# Patient Record
Sex: FEMALE | Race: OTHER | HISPANIC OR LATINO | Employment: FULL TIME | URBAN - METROPOLITAN AREA
[De-identification: names, ages, dates, MRNs, and addresses within clinical notes are randomized per-mention and may not be internally consistent; named-entity substitution may affect disease eponyms.]

---

## 2020-03-14 ENCOUNTER — APPOINTMENT (OUTPATIENT)
Dept: URGENT CARE | Age: 55
End: 2020-03-14
Payer: OTHER MISCELLANEOUS

## 2020-03-14 ENCOUNTER — HOSPITAL ENCOUNTER (EMERGENCY)
Facility: HOSPITAL | Age: 55
Discharge: HOME | End: 2020-03-14
Attending: EMERGENCY MEDICINE | Admitting: EMERGENCY MEDICINE
Payer: OTHER MISCELLANEOUS

## 2020-03-14 VITALS
TEMPERATURE: 98.1 F | SYSTOLIC BLOOD PRESSURE: 129 MMHG | WEIGHT: 139.77 LBS | RESPIRATION RATE: 16 BRPM | OXYGEN SATURATION: 99 % | DIASTOLIC BLOOD PRESSURE: 71 MMHG | HEART RATE: 66 BPM
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
## 2020-03-14 DIAGNOSIS — M54.2 NECK PAIN: ICD-10-CM

## 2020-03-14 DIAGNOSIS — R51.9 HEADACHE: Primary | ICD-10-CM

## 2020-03-14 DIAGNOSIS — S09.90XA: ICD-10-CM

## 2020-03-14 DIAGNOSIS — T14.8XXA CONTUSION: ICD-10-CM

## 2020-03-14 PROCEDURE — G0382 LEV 3 HOSP TYPE B ED VISIT: HCPCS | Performed by: PHYSICIAN ASSISTANT

## 2020-03-14 PROCEDURE — 99284 EMERGENCY DEPT VISIT MOD MDM: CPT | Performed by: PHYSICIAN ASSISTANT

## 2020-03-14 PROCEDURE — 99283 EMERGENCY DEPT VISIT LOW MDM: CPT

## 2020-03-14 PROCEDURE — 99283 EMERGENCY DEPT VISIT LOW MDM: CPT | Performed by: PHYSICIAN ASSISTANT

## 2020-03-14 RX ORDER — IBUPROFEN 400 MG/1
800 TABLET ORAL ONCE
Status: COMPLETED | OUTPATIENT
Start: 2020-03-14 | End: 2020-03-14

## 2020-03-14 RX ORDER — IBUPROFEN 400 MG/1
400 TABLET ORAL EVERY 6 HOURS PRN
Qty: 30 TABLET | Refills: 0 | Status: SHIPPED | OUTPATIENT
Start: 2020-03-14 | End: 2020-03-14 | Stop reason: SDUPTHER

## 2020-03-14 RX ORDER — ACETAMINOPHEN 500 MG
500 TABLET ORAL EVERY 6 HOURS PRN
Qty: 30 TABLET | Refills: 0 | Status: SHIPPED | OUTPATIENT
Start: 2020-03-14 | End: 2020-03-14 | Stop reason: SDUPTHER

## 2020-03-14 RX ORDER — ACETAMINOPHEN 325 MG/1
975 TABLET ORAL ONCE
Status: COMPLETED | OUTPATIENT
Start: 2020-03-14 | End: 2020-03-14

## 2020-03-14 RX ORDER — IBUPROFEN 400 MG/1
400 TABLET ORAL EVERY 6 HOURS PRN
Qty: 30 TABLET | Refills: 0 | Status: SHIPPED | OUTPATIENT
Start: 2020-03-14 | End: 2022-04-04

## 2020-03-14 RX ORDER — ACETAMINOPHEN 500 MG
500 TABLET ORAL EVERY 6 HOURS PRN
Qty: 30 TABLET | Refills: 0 | Status: SHIPPED | OUTPATIENT
Start: 2020-03-14 | End: 2022-04-04

## 2020-03-14 RX ADMIN — IBUPROFEN 800 MG: 400 TABLET ORAL at 12:41

## 2020-03-14 RX ADMIN — ACETAMINOPHEN 975 MG: 325 TABLET ORAL at 12:42

## 2020-03-14 NOTE — ED PROVIDER NOTES
History  Chief Complaint   Patient presents with    Headache     pt c/o headache; pt states this morning a 2"x4" that was leaning against the wall fell forward and struck the pt on her right side of her head; pt denies any LOC or any thinners  pt denies cp/sob, pt denies n/v/d  At work this day and a 'stick' struck pt on head R side 24 leaning against the wall  720 am        History provided by:  Patient   used: [id number]  Injury   Location:  Right lateral head  Quality:  Sore  Severity:  Moderate  Onset quality:  Sudden  Timing:  Constant  Progression:  Unchanged  Chronicity:  New  Context:  Work related injury  Worsened by:  Nothing tried  Ineffective treatments:  Nothing tried  Associated symptoms: headaches    Associated symptoms: no abdominal pain, no chest pain and no fever    Associated symptoms comment:  Right lateral neck pain      None       History reviewed  No pertinent past medical history  Past Surgical History:   Procedure Laterality Date    HYSTERECTOMY         History reviewed  No pertinent family history  I have reviewed and agree with the history as documented  E-Cigarette/Vaping    E-Cigarette Use Never User      E-Cigarette/Vaping Substances     Social History     Tobacco Use    Smoking status: Never Smoker    Smokeless tobacco: Never Used   Substance Use Topics    Alcohol use: Yes     Frequency: Monthly or less     Drinks per session: 1 or 2     Comment: socially    Drug use: Never       Review of Systems   Constitutional: Negative for chills and fever  Eyes: Negative for pain  Respiratory: Negative for chest tightness  Cardiovascular: Negative for chest pain  Gastrointestinal: Negative for abdominal pain  Endocrine: Negative for polyphagia  Musculoskeletal: Positive for neck pain  Right lateral neck pain  Neurological: Positive for headaches  Negative for dizziness and weakness     All other systems reviewed and are negative  Physical Exam  Physical Exam   Constitutional: She is oriented to person, place, and time  She appears well-developed and well-nourished  HENT:   Head: Normocephalic  Eyes: Conjunctivae are normal    Neck: Normal range of motion  Neck supple  Cardiovascular: Normal rate  Pulmonary/Chest: Effort normal    Abdominal: Soft  Musculoskeletal: Normal range of motion  Neurological: She is alert and oriented to person, place, and time  She displays normal reflexes  No cranial nerve deficit or sensory deficit  She exhibits normal muscle tone  Coordination normal    Skin: Skin is warm  Capillary refill takes less than 2 seconds  Psychiatric: She has a normal mood and affect  Nursing note and vitals reviewed            Vital Signs  ED Triage Vitals [03/14/20 1200]   Temperature Pulse Respirations Blood Pressure SpO2   98 1 °F (36 7 °C) 65 16 133/63 100 %      Temp Source Heart Rate Source Patient Position - Orthostatic VS BP Location FiO2 (%)   Temporal Monitor Sitting Right arm --      Pain Score       Worst Possible Pain           Vitals:    03/14/20 1200 03/14/20 1346   BP: 133/63 129/71   Pulse: 65 66   Patient Position - Orthostatic VS: Sitting Lying         Visual Acuity  Visual Acuity      Most Recent Value   L Pupil Size (mm)  3   R Pupil Size (mm)  3          ED Medications  Medications   ibuprofen (MOTRIN) tablet 800 mg (800 mg Oral Given 3/14/20 1241)   acetaminophen (TYLENOL) tablet 975 mg (975 mg Oral Given 3/14/20 1242)       Diagnostic Studies  Results Reviewed     None                 No orders to display              Procedures  Procedures         ED Course                                 MDM  Number of Diagnoses or Management Options  Acute head injury:   Contusion:   Headache:   Neck pain:   Work related injury:   Diagnosis management comments: 58-year-old primary Danish-speaking female presents emergency department for work related injury this morning approximately 7: 20 a m  Gwen Garvin Patient states that she was in her work environment and a 2x4 please see imaging had fallen from the wall striking her in the right lateral head  Patient denies falling to the ground or loss of consciousness  Patient does admit to right lateral head pain as well as right neck pain  Patient denies weakness of the hands arms legs or feet  Patient denies any specific radiculopathy or changes of her overall standard condition  Did not feel that CT and or neck x-ray would aid in diagnosis or management this day  Encourage close follow-up with occupational medicine  Educated patient take medicines as prescribed  Educated patient on persistent worsening signs symptoms and to either follow up with primary care occupational medicine and return emergency department  Patient is understanding and agreement  The language line was utilized during the history physical and educational phases  Disposition  Final diagnoses:   Headache   Acute head injury   Work related injury   Neck pain   Contusion     Time reflects when diagnosis was documented in both MDM as applicable and the Disposition within this note     Time User Action Codes Description Comment    3/14/2020 12:55 PM Millie Cristino Add [R51] Headache     3/14/2020 12:55 PM Millie Cristino Add [H61 38AK] Acute head injury     3/14/2020 12:55 PM Millie Cristino Add [Y99 0] Work related injury     3/14/2020 12:56 PM Millie Cristino Add [M54 2] Neck pain     3/14/2020 12:56 PM Ernesto La Add Grayson Pérez  [address] Contusion       ED Disposition     ED Disposition Condition Date/Time Comment    Discharge Stable Sat Mar 14, 2020 12:55 PM Thomas Sorenson discharge to home/self care              Follow-up Information     Follow up With Specialties Details Why [address] Occupational Medicine Call today  Rubén Palumbo [address]  [phone]            Discharge Medication List as of 3/14/2020  1:00 PM      START taking these medications Details   acetaminophen (TYLENOL) 500 mg tablet Take 1 tablet (500 mg total) by mouth every 6 (six) hours as needed for mild pain or fever, Starting Sat 3/14/2020, Print      ibuprofen (MOTRIN) 400 mg tablet Take 1 tablet (400 mg total) by mouth every 6 (six) hours as needed for mild pain or fever, Starting Sat 3/14/2020, Print           No discharge procedures on file      PDMP Review     None          ED Provider  Electronically Signed by           Cira Lynne PA-C  03/14/20 4474

## 2020-03-16 ENCOUNTER — APPOINTMENT (OUTPATIENT)
Dept: URGENT CARE | Age: 55
End: 2020-03-16
Payer: OTHER MISCELLANEOUS

## 2020-03-16 PROCEDURE — 99214 OFFICE O/P EST MOD 30 MIN: CPT | Performed by: PREVENTIVE MEDICINE

## 2020-03-23 ENCOUNTER — APPOINTMENT (OUTPATIENT)
Dept: URGENT CARE | Age: 55
End: 2020-03-23
Payer: OTHER MISCELLANEOUS

## 2020-03-23 PROCEDURE — 99213 OFFICE O/P EST LOW 20 MIN: CPT | Performed by: PREVENTIVE MEDICINE

## 2022-01-10 DIAGNOSIS — Z11.59 ENCOUNTER FOR SCREENING FOR OTHER VIRAL DISEASES: Primary | ICD-10-CM

## 2022-01-10 PROCEDURE — U0003 INFECTIOUS AGENT DETECTION BY NUCLEIC ACID (DNA OR RNA); SEVERE ACUTE RESPIRATORY SYNDROME CORONAVIRUS 2 (SARS-COV-2) (CORONAVIRUS DISEASE [COVID-19]), AMPLIFIED PROBE TECHNIQUE, MAKING USE OF HIGH THROUGHPUT TECHNOLOGIES AS DESCRIBED BY CMS-2020-01-R: HCPCS | Performed by: FAMILY MEDICINE

## 2022-01-13 ENCOUNTER — TELEPHONE (OUTPATIENT)
Dept: OTHER | Facility: OTHER | Age: 57
End: 2022-01-13

## 2022-01-13 NOTE — TELEPHONE ENCOUNTER
Your test for COVID-19, also known as novel coronavirus, came back negative  You do not have COVID-19  If you have any additional questions, we can schedule a virtual visit for you with a provider or call the [facility] hotline [phone] Option 7 for care advice  For additional information , please visit the Coronavirus FAQ on the [id number] [address]  (Byron Titus  [facility])

## 2022-01-13 NOTE — TELEPHONE ENCOUNTER
First attempt  The patient was called for notification of a test result for COVID-19  The patient did not answer the phone and the patient does not have a voicemail box set up  Unable to leave a voicemail

## 2022-04-04 ENCOUNTER — HOSPITAL ENCOUNTER (EMERGENCY)
Facility: HOSPITAL | Age: 57
Discharge: HOME | End: 2022-04-04
Attending: EMERGENCY MEDICINE
Payer: COMMERCIAL

## 2022-04-04 ENCOUNTER — APPOINTMENT (EMERGENCY)
Dept: RADIOLOGY | Facility: HOSPITAL | Age: 57
End: 2022-04-04

## 2022-04-04 VITALS
SYSTOLIC BLOOD PRESSURE: 145 MMHG | OXYGEN SATURATION: 94 % | DIASTOLIC BLOOD PRESSURE: 65 MMHG | TEMPERATURE: 98.3 F | RESPIRATION RATE: 20 BRPM | HEART RATE: 57 BPM

## 2022-04-04 DIAGNOSIS — M25.512 LEFT SHOULDER PAIN: ICD-10-CM

## 2022-04-04 DIAGNOSIS — S20.02XA CONTUSION OF LEFT BREAST, INITIAL ENCOUNTER: ICD-10-CM

## 2022-04-04 DIAGNOSIS — M54.2 NECK PAIN: ICD-10-CM
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
## 2022-04-04 PROCEDURE — 73030 X-RAY EXAM OF SHOULDER: CPT

## 2022-04-04 PROCEDURE — 99283 EMERGENCY DEPT VISIT LOW MDM: CPT

## 2022-04-04 PROCEDURE — 99284 EMERGENCY DEPT VISIT MOD MDM: CPT

## 2022-04-04 RX ORDER — ACETAMINOPHEN 325 MG/1
650 TABLET ORAL ONCE
Status: COMPLETED | OUTPATIENT
Start: 2022-04-04 | End: 2022-04-04

## 2022-04-04 RX ORDER — LIDOCAINE 50 MG/G
1 PATCH TOPICAL ONCE
Status: DISCONTINUED | OUTPATIENT
Start: 2022-04-04 | End: 2022-04-04 | Stop reason: HOSPADM

## 2022-04-04 RX ADMIN — LIDOCAINE 1 PATCH: 50 PATCH CUTANEOUS at 11:10

## 2022-04-04 RX ADMIN — ACETAMINOPHEN 325MG 650 MG: 325 TABLET ORAL at 11:10

## 2022-04-04 NOTE — Clinical Note
Gwendolyn Grace was seen and treated in our emergency department on 4/4/2022  Diagnosis:     Sandy Bryant    She may return on this date:     Please excuse Gwendolyn Grace from work today as she was seen in the emergency department on 04/04  She can also be excused from work on 04/05  After this she needs to get further work excuses from [address] [place] [address], Physical therapist, or her family doctor  If you have any questions or concerns, please don't hesitate to call        Devang Duty    ______________________________           _______________          _______________  Hospital Representative                              Date                                Time

## 2022-04-04 NOTE — DISCHARGE INSTRUCTIONS
I have given you a referral to family doctor, they should be calling to make an appointment  I have also provided you with numbers for local family practice is that you may call as well  Is important for you to follow-up with your family doctor for continuing management of your symptoms    Please return to the emergency department for any drastic worsening of your symptoms, continue to take your Robaxin, ibuprofen and you can also take Tylenol
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
## 2022-09-13 ENCOUNTER — OFFICE VISIT (OUTPATIENT)
Dept: URGENT CARE | Age: 57
End: 2022-09-13
Payer: COMMERCIAL

## 2022-09-13 VITALS
BODY MASS INDEX: 31.49 KG/M2 | HEIGHT: 58 IN | HEART RATE: 74 BPM | TEMPERATURE: 98.7 F | RESPIRATION RATE: 17 BRPM | WEIGHT: 150 LBS | OXYGEN SATURATION: 98 % | DIASTOLIC BLOOD PRESSURE: 74 MMHG | SYSTOLIC BLOOD PRESSURE: 138 MMHG

## 2022-09-13 DIAGNOSIS — M25.511 ACUTE PAIN OF RIGHT SHOULDER: Primary | ICD-10-CM

## 2022-09-13 PROCEDURE — 99283 EMERGENCY DEPT VISIT LOW MDM: CPT

## 2022-09-13 PROCEDURE — G0382 LEV 3 HOSP TYPE B ED VISIT: HCPCS

## 2022-09-13 RX ORDER — NAPROXEN 500 MG/1
500 TABLET ORAL 2 TIMES DAILY WITH MEALS
Qty: 14 TABLET | Refills: 0 | Status: SHIPPED | OUTPATIENT
Start: 2022-09-13 | End: 2022-09-20

## 2022-09-13 NOTE — LETTER
September 13, 2022     Patient: Keri Webb   YOB: 1965   Date of Visit: 9/13/2022       To Whom it May Concern:    Keri Webb was seen in my clinic on 9/13/2022  Please excuse her from work today  She may return tomorrow, 9/14/2022  Thank you         Sincerely,              Katie Tran

## 2022-09-13 NOTE — PROGRESS NOTES
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  NSAID as directed  Referral to PT  Follow up with PCP in 2-3 days  Proceed to ER if symptoms worsen  Chief Complaint     Chief Complaint   Patient presents with    Shoulder Pain     Pt started w R shoulder pain last Wed during normal activity  Denies numb/tingling  Inceased pain w  Elevation over head & rotation,         History of Present Illness     61 y o  R hand dominant F presents with complaint of R shoulder pain x 1 week  States she works as a  and was lifting a heavy box on Wednesday  Describes pain as 10/10  Denies numbness or tingling  Denies prior trauma or surgery  Taking Ibuprofen OTC  Review of Systems   Review of Systems   Constitutional: Negative for chills, fatigue and fever  HENT: Negative for congestion, ear pain, facial swelling, hearing loss, rhinorrhea, sinus pressure, sneezing, sore throat and trouble swallowing  Eyes: Negative for pain, redness and visual disturbance  Respiratory: Negative for cough, chest tightness, shortness of breath and wheezing  Cardiovascular: Negative for chest pain and palpitations  Gastrointestinal: Negative for abdominal pain, diarrhea, nausea and vomiting  Genitourinary: Negative for dysuria, flank pain, hematuria and pelvic pain  Musculoskeletal: Positive for arthralgias  Negative for back pain and myalgias  Skin: Negative for color change and rash  Neurological: Negative for dizziness, seizures, syncope, weakness, light-headedness, numbness and headaches  Psychiatric/Behavioral: Negative for confusion, hallucinations and sleep disturbance  The patient is not nervous/anxious  All other systems reviewed and are negative  Current Medications     No current outpatient medications on file  Current Allergies     Allergies as of 09/13/2022    (No Known Allergies)            The following portions of the patient's history were reviewed and updated as appropriate: allergies, current medications, past family history, past medical history, past social history, past surgical history and problem list      History reviewed  No pertinent past medical history  Past Surgical History:   Procedure Laterality Date    HYSTERECTOMY         History reviewed  No pertinent family history  Medications have been verified  Objective   /74   Pulse 74   Temp 98 7 °F (37 1 °C)   Resp 17   Ht 4' 9 5" (1 461 m)   Wt 68 kg (150 lb)   SpO2 98%   BMI 31 90 kg/m²   No LMP recorded  Patient is postmenopausal        Physical Exam     Physical Exam  Vitals reviewed  Constitutional:       General: She is not in acute distress  Appearance: Normal appearance  She is not toxic-appearing  HENT:      Head: Normocephalic  Right Ear: Tympanic membrane normal  Tympanic membrane is not erythematous or bulging  Left Ear: Tympanic membrane normal  Tympanic membrane is not erythematous or bulging  Nose: No congestion or rhinorrhea  Right Sinus: No maxillary sinus tenderness or frontal sinus tenderness  Left Sinus: No maxillary sinus tenderness or frontal sinus tenderness  Mouth/Throat:      Pharynx: Uvula midline  No oropharyngeal exudate, posterior oropharyngeal erythema or uvula swelling  Tonsils: No tonsillar exudate or tonsillar abscesses  Eyes:      Extraocular Movements: Extraocular movements intact  Conjunctiva/sclera: Conjunctivae normal       Pupils: Pupils are equal, round, and reactive to light     Cardiovascular: Rate and Rhythm: Normal rate and regular rhythm  Pulses: Normal pulses  Heart sounds: Normal heart sounds  Pulmonary:      Effort: Pulmonary effort is normal  No tachypnea or respiratory distress  Breath sounds: Normal breath sounds and air entry  No decreased breath sounds, wheezing, rhonchi or rales  Abdominal:      General: Bowel sounds are normal       Palpations: Abdomen is soft  Tenderness: There is no abdominal tenderness  There is no right CVA tenderness or guarding  Musculoskeletal:         General: Normal range of motion  Right shoulder: Tenderness present  No deformity  Normal strength  Normal pulse  Cervical back: Normal range of motion  Comments:   Generalized tenderness throughout shoulder joint  Abduction to 180  Unable to do lift off  Negative empty can/neers  +ornelas  Neurovasc intact   strength 5/5   Lymphadenopathy:      Cervical: No cervical adenopathy  Skin:     General: Skin is warm and dry  Neurological:      General: No focal deficit present  Mental Status: She is alert  Cranial Nerves: Cranial nerves are intact  Sensory: Sensation is intact  Motor: Motor function is intact  Coordination: Coordination is intact  Gait: Gait is intact  Deep Tendon Reflexes: Reflexes are normal and symmetric

## 2022-09-13 NOTE — PATIENT INSTRUCTIONS
Your x-ray did not reveal any fractures or osseous abnormalities  Symptoms are consistent with a shoulder impingement/overuse injury from lifting at work  Start NSAID as directed  Naproxen sent to pharmacy  Referral placed to physical therapy  Follow up with PCP in 2-3 days  Proceed to ER if symptoms worsen

## 2022-09-20 ENCOUNTER — OCCMED (OUTPATIENT)
Dept: URGENT CARE | Facility: MEDICAL CENTER | Age: 57
End: 2022-09-20
Payer: OTHER MISCELLANEOUS

## 2022-09-20 DIAGNOSIS — S46.911A RIGHT SHOULDER STRAIN, INITIAL ENCOUNTER: Primary | ICD-10-CM
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
## 2022-09-20 PROCEDURE — 99213 OFFICE O/P EST LOW 20 MIN: CPT | Performed by: PHYSICIAN ASSISTANT

## 2022-09-27 ENCOUNTER — APPOINTMENT (OUTPATIENT)
Dept: URGENT CARE | Facility: MEDICAL CENTER | Age: 57
End: 2022-09-27
Payer: OTHER MISCELLANEOUS

## 2022-09-27 PROCEDURE — 99213 OFFICE O/P EST LOW 20 MIN: CPT | Performed by: EMERGENCY MEDICINE
